# Patient Record
Sex: MALE | URBAN - METROPOLITAN AREA
[De-identification: names, ages, dates, MRNs, and addresses within clinical notes are randomized per-mention and may not be internally consistent; named-entity substitution may affect disease eponyms.]

---

## 2017-02-08 ENCOUNTER — TRANSFERRED RECORDS (OUTPATIENT)
Dept: HEALTH INFORMATION MANAGEMENT | Facility: CLINIC | Age: 77
End: 2017-02-08

## 2017-02-16 ENCOUNTER — MEDICAL CORRESPONDENCE (OUTPATIENT)
Dept: HEALTH INFORMATION MANAGEMENT | Facility: CLINIC | Age: 77
End: 2017-02-16

## 2017-02-16 ENCOUNTER — TRANSFERRED RECORDS (OUTPATIENT)
Dept: HEALTH INFORMATION MANAGEMENT | Facility: CLINIC | Age: 77
End: 2017-02-16

## 2017-04-17 ENCOUNTER — OFFICE VISIT (OUTPATIENT)
Dept: OPHTHALMOLOGY | Facility: CLINIC | Age: 77
End: 2017-04-17
Attending: OPHTHALMOLOGY
Payer: MEDICARE

## 2017-04-17 DIAGNOSIS — H40.1133 PRIMARY OPEN ANGLE GLAUCOMA OF BOTH EYES, SEVERE STAGE: Primary | ICD-10-CM

## 2017-04-17 PROCEDURE — 99215 OFFICE O/P EST HI 40 MIN: CPT | Mod: ZF

## 2017-04-17 RX ORDER — LOSARTAN POTASSIUM 100 MG/1
TABLET ORAL
COMMUNITY
Start: 2017-02-13

## 2017-04-17 RX ORDER — AMLODIPINE BESYLATE 5 MG/1
TABLET ORAL
COMMUNITY
Start: 2017-02-28

## 2017-04-17 RX ORDER — LATANOPROST 50 UG/ML
1 SOLUTION/ DROPS OPHTHALMIC AT BEDTIME
COMMUNITY

## 2017-04-17 RX ORDER — TIMOLOL MALEATE 5 MG/ML
1 SOLUTION/ DROPS OPHTHALMIC 2 TIMES DAILY
COMMUNITY

## 2017-04-17 RX ORDER — ROSUVASTATIN CALCIUM 40 MG/1
TABLET, COATED ORAL
COMMUNITY
Start: 2017-02-28

## 2017-04-17 RX ORDER — EZETIMIBE 10 MG/1
TABLET ORAL
COMMUNITY
Start: 2017-03-13

## 2017-04-17 ASSESSMENT — CONF VISUAL FIELD
OS_SUPERIOR_NASAL_RESTRICTION: 3
OD_SUPERIOR_NASAL_RESTRICTION: 3

## 2017-04-17 ASSESSMENT — PACHYMETRY
OD_CT(UM): 551
OS_CT(UM): 527

## 2017-04-17 ASSESSMENT — SLIT LAMP EXAM - LIDS
COMMENTS: NORMAL
COMMENTS: NORMAL

## 2017-04-17 ASSESSMENT — VISUAL ACUITY
METHOD: SNELLEN - LINEAR
OS_CC+: -2
OD_CC+: +2
OD_CC: 20/30 SLOW
CORRECTION_TYPE: GLASSES
OS_CC: 20/25 SLOW

## 2017-04-17 ASSESSMENT — TONOMETRY
OD_IOP_MMHG: 08
IOP_METHOD: APPLANATION
OS_IOP_MMHG: 10

## 2017-04-17 ASSESSMENT — REFRACTION_WEARINGRX
OS_SPHERE: PLANO
SPECS_TYPE: PAL
OD_CYLINDER: +1.25
OS_AXIS: 116
OS_CYLINDER: +1.25
OS_ADD: +2.75
OD_AXIS: 006
OD_SPHERE: -0.50
OD_ADD: +2.75

## 2017-04-17 ASSESSMENT — CUP TO DISC RATIO
OS_RATIO: 0.75
OD_RATIO: 0.85

## 2017-04-17 NOTE — NURSING NOTE
Chief Complaints and History of Present Illnesses   Patient presents with     Consult For     see and do (right eye) from Dr. Ramirez     HPI    Affected eye(s):  Both, Right   Symptoms:     Decreased vision   Double vision (Comment: notes only occasionally up-and-down (doesn't go away when closes an eye))   No floaters   No flashes         Do you have eye pain now?:  No      Comments:  Pt referred by Dr. Ramirez for eval of the right eye - pressure spiked, per pt  Pt states vision seems to be worsening - pt states his VA fluctuates throughout the day    Ocular meds:  Latanoprost QHS OU  Timolol 0.5% BID OD (just added recently)    Jacquelyn MCGRAW 12:58 PM April 17, 2017

## 2017-04-17 NOTE — MR AVS SNAPSHOT
After Visit Summary   4/17/2017    Suresh Nick    MRN: [id number]           Patient Information     Date Of Birth          1940        Visit Information        Provider Department      4/17/2017 12:45 PM Megan Ferrara MD [facility]        Today's Diagnoses     Primary open angle glaucoma of both eyes, severe stage    -  1       Follow-ups after your visit        Who to contact     Please call your clinic at [phone] to:    Ask questions about your health    Make or cancel appointments    Discuss your medicines    Learn about your test results    Speak to your doctor   If you have compliments or concerns about an experience at your clinic, or if you wish to file a complaint, please contact [facility] Patient Relations at [phone] or email us at [email or web address]         Additional Information About Your Visit        MyChart Information     [employer] gives you secure access to your electronic health record. If you see a primary care provider, you can also send messages to your care team and make appointments. If you have questions, please call your primary care clinic.  If you do not have a primary care provider, please call [phone] and they will assist you.      [employer] is an electronic gateway that provides easy, online access to your medical records. With [employer], you can request a clinic appointment, read your test results, renew a prescription or communicate with your care team.     To access your existing account, please contact your [facility] or call [phone] for assistance.        Care EveryWhere ID     This is your Care EveryWhere ID. This could be used by other organizations to access your [place] medical records  [id number]         Blood Pressure from Last 3 Encounters:   No data found for BP    Weight from Last 3 Encounters:   No data found for Wt              We Performed the Following      Low Vision Central OU        Primary Care Provider Office Phone # Fax #    Abelardo [facility]ELIS[facility] [phone]       [facility] [address] [facility] [address]  [facility] [place] [address]        Thank you!     Thank you for choosing [facility]IN[facility]  for your care. Our goal is always to provide you with excellent care. Hearing back from our patients is one way we can continue to improve our services. Please take a few minutes to complete the written survey that you may receive in the mail after your visit with us. Thank you!             Your Updated Medication List - Protect others around you: Learn how to safely use, store and throw away your medicines at www.disposemymeds.org.          This list is accurate as of: 4/17/17 11:59 PM.  Always use your most recent med list.                   Brand Name Dispense Instructions for use    amLODIPine 5 MG tablet    NORVASC         ezetimibe 10 MG tablet    ZETIA         latanoprost 0.005 % ophthalmic solution    XALATAN     Place 1 drop into both eyes At Bedtime       losartan 100 MG tablet    COZAAR         rosuvastatin 40 MG tablet    CRESTOR         timolol 0.5 % ophthalmic solution    TIMOPTIC     Place 1 drop into the right eye 2 times daily

## 2017-04-17 NOTE — PROGRESS NOTES
CC: referred by Dr Ramirez for consideration of additional glaucoma surgery.  Vision intermittently blurred both eyes     HPI: Advanced Primary open angle glaucoma (POAG) both eyes with intraocular pressure typically 10-12 mmHg with one episode intraocular pressure 17 right eye; timolol added right eye at that visit.  Noted to have recent worsening of visual field right eye.  Patient reports he is Amish about taking his drops.  He notes vision fluctuates both eyes but overall feels there is no true worsening.    PAST OCULAR HISTORY:  Combined trabeculectomy mitomycin-C with cataract extraction and intraocular lens    Right eye  2003   Left eye 2011  Intolerant of brimonidine     MEDICATIONS:  Timolol twice a day right eye   Latanoprost both eyes at bedtime     PMH:  High cholesterol  Systemic hypertension    TESTING TODAY:  Extensive collection of HVF [place] Fast provided by Dr Ramirez from 2012 to 2017 showing loss of central island right eye on 2-8-17 visual field. Significant fluctuation in both eyes over the years    LVC visual field (Size V) done after dilation today with return of central island right eye     EXAM:  Bleb overhanging cornea but same amount as photos  Marked cupping both eyes     IMPRESSION:  Variable visual fields with recent visual field right eye that looked worse, but better today on retesting  Since visual field has reverted to baseline I recommend no further surgery at this time  Would probably benefit from testing visual fields with a size 5 Fastpac rather than size III target going forward  Intraocular pressure very good today 8/10 with normal/thin corneal thickness  Patient does have variable vision that may be due to try eyes or poor tear film from overhanging bleb  Since overhanging bleb is stable I would not revise it at this time    PLAN:  Return for routine follow up with Dr Ramirez  I am happy to see Mr. Nick again at any time    Attending Physician Attestation:   Complete documentation of historical and exam elements from today's encounter can be found in the full encounter summary report (not reduplicated in this progress note). I personally obtained the chief complaint(s) and history of present illness. I confirmed and edited asnecessary the review of systems, past medical/surgical history, family history, social history, and examination findings as documented by others; and I examined the patient myself. I personally reviewed the relevant tests, images, and reports as documented above. I formulated and edited as necessary the assessment and plan and discussed the findings and management plan with the patient and family.  - Megan Ferrara MD 11:58 AM 4/17/2017

## 2017-04-17 NOTE — LETTER
2017      Ezra Ramirez MD  [facility]  [address] , [address]  [place] [address]  Fax: [phone]      RE: SURESH LORENZO  : 1940      Dear Dr. Ramirez:    I had the pleasure of seeing Suresh Nick on 2017 at the [facility].  My exam findings are as follows:      Visual Acuity (Snellen - Linear)      Right Left   Dist cc 20/30 slow +2 20/25 slow -2   Dist ph cc NI        Correction:  Glasses         Tonometry (Applanation, 1:31 PM)      Right Left   Pressure 08 10       Pulsating OU         Main Ophthalmology Exam     Slit Lamp Exam      Right Left    Lids/Lashes Normal Normal    Conjunctiva/Sclera Bleb Bleb    Cornea bleb 3.5 mm on to cornea Clear    Anterior Chamber Deep and quiet Deep and quiet    Iris Dilated Dilated    Lens Posterior chamber intraocular lens, mod posterior capsular opacity (PCO) in periphery-visual axis clear Posterior chamber intraocular lens, mod posterior capsular opacity (PCO) in periphery-visual axis clear      Fundus Exam      Right Left    Disc inferior notch to rim undermined inferiorly    C/D Ratio 0.85 0.75    Macula Normal Normal    Vessels Normal Normal    Periphery Normal Normal           History and Present Illness:  Referred by Dr. Ramirez for consideration of additional glaucoma surgery.  Vision intermittently blurred both eyes     Advanced Primary open angle glaucoma (POAG) both eyes with intraocular pressure typically 10-12 mmHg with one episode intraocular pressure 17 right eye; timolol added right eye at that visit.  Noted to have recent worsening of visual field right eye.  Patient reports he is Mu-ism about taking his drops.  He notes vision fluctuates both eyes but overall feels there is no true worsening.    Past Ocular History:  Combined trabeculectomy mitomycin-C with cataract extraction and intraocular lens    Right eye     Left eye   Intolerant of brimonidine      Medications:  Timolol twice a day right eye   Latanoprost both eyes at bedtime     Past Medical History:  High cholesterol  Systemic hypertension    Testing Today:  Extensive collection of HVF [place] Fast provided by Dr. Ramirez from 2012 to 2017 showing loss of central island right eye on 2-8-17 visual field. Significant fluctuation in both eyes over the years    [facility] visual field (Size V) done after dilation today with return of [place] right eye     Exam:  Bleb overhanging cornea but same amount as photos  Marked cupping both eyes     Impression:  Variable visual fields with recent visual field right eye that looked worse, but better today on retesting  Since visual field has reverted to baseline I recommend no further surgery at this time  Would probably benefit from testing visual fields with a size 5 Fastpac rather than size III target going forward  Intraocular pressure very good today 8/10 with normal/thin corneal thickness  Patient does have variable vision that may be due to try eyes or poor tear film from overhanging bleb  Since overhanging bleb is stable I would not revise it at this time        Plan:  Return for routine follow up with Dr. Ramirez  I am happy to see Mr. Nick again at any time    Thank you for allowing me to participate in his care.       Sincerely,       Megan Ferrara MD  Glaucoma   [employer] Professor of Ophthalmology    Enclosed: Visual field

## 2019-11-05 ENCOUNTER — HEALTH MAINTENANCE LETTER (OUTPATIENT)
Age: 79
End: 2019-11-05

## 2020-02-16 ENCOUNTER — HEALTH MAINTENANCE LETTER (OUTPATIENT)
Age: 80
End: 2020-02-16

## 2020-11-22 ENCOUNTER — HEALTH MAINTENANCE LETTER (OUTPATIENT)
Age: 80
End: 2020-11-22

## 2021-04-10 ENCOUNTER — HEALTH MAINTENANCE LETTER (OUTPATIENT)
Age: 81
End: 2021-04-10

## 2021-09-19 ENCOUNTER — HEALTH MAINTENANCE LETTER (OUTPATIENT)
Age: 81
End: 2021-09-19

## 2022-05-01 ENCOUNTER — HEALTH MAINTENANCE LETTER (OUTPATIENT)
Age: 82
End: 2022-05-01

## 2022-11-21 ENCOUNTER — HEALTH MAINTENANCE LETTER (OUTPATIENT)
Age: 82
End: 2022-11-21

## 2023-06-02 ENCOUNTER — HEALTH MAINTENANCE LETTER (OUTPATIENT)
Age: 83
End: 2023-06-02

## 2023-07-17 ENCOUNTER — TRANSFERRED RECORDS (OUTPATIENT)
Dept: HEALTH INFORMATION MANAGEMENT | Facility: CLINIC | Age: 83
End: 2023-07-17

## 2023-12-04 ENCOUNTER — TRANSFERRED RECORDS (OUTPATIENT)
Dept: HEALTH INFORMATION MANAGEMENT | Facility: CLINIC | Age: 83
End: 2023-12-04

## 2024-07-22 ENCOUNTER — TRANSFERRED RECORDS (OUTPATIENT)
Dept: HEALTH INFORMATION MANAGEMENT | Facility: CLINIC | Age: 84
End: 2024-07-22
Payer: MEDICARE

## 2025-01-16 ENCOUNTER — TRANSFERRED RECORDS (OUTPATIENT)
Dept: HEALTH INFORMATION MANAGEMENT | Facility: CLINIC | Age: 85
End: 2025-01-16
Payer: MEDICARE